# Patient Record
Sex: MALE
[De-identification: names, ages, dates, MRNs, and addresses within clinical notes are randomized per-mention and may not be internally consistent; named-entity substitution may affect disease eponyms.]

---

## 2017-06-17 ENCOUNTER — HOSPITAL ENCOUNTER (OUTPATIENT)
Dept: HOSPITAL 42 - ED | Age: 22
Setting detail: OBSERVATION
Discharge: HOME | End: 2017-06-17
Attending: EMERGENCY MEDICINE | Admitting: EMERGENCY MEDICINE
Payer: MEDICAID

## 2017-06-17 VITALS
DIASTOLIC BLOOD PRESSURE: 60 MMHG | OXYGEN SATURATION: 97 % | SYSTOLIC BLOOD PRESSURE: 131 MMHG | HEART RATE: 58 BPM | RESPIRATION RATE: 16 BRPM

## 2017-06-17 VITALS — BODY MASS INDEX: 18.2 KG/M2

## 2017-06-17 VITALS — TEMPERATURE: 98.5 F

## 2017-06-17 DIAGNOSIS — R10.9: Primary | ICD-10-CM

## 2017-06-17 LAB
ADD MANUAL DIFF?: NO
ALBUMIN/GLOB SERPL: 1.2 {RATIO} (ref 1.1–1.8)
ALP SERPL-CCNC: 73 U/L (ref 38–133)
ALT SERPL-CCNC: 35 U/L (ref 7–56)
APTT BLD: 31.3 SECONDS (ref 23.7–30.8)
AST SERPL-CCNC: 26 U/L (ref 15–59)
BASOPHILS # BLD AUTO: 0.02 K/MM3 (ref 0–2)
BASOPHILS NFR BLD: 0.3 % (ref 0–3)
BILIRUB SERPL-MCNC: 0.9 MG/DL (ref 0.2–1.3)
BUN SERPL-MCNC: 15 MG/DL (ref 7–21)
CALCIUM SERPL-MCNC: 9.8 MG/DL (ref 8.4–10.5)
CHLORIDE SERPL-SCNC: 102 MMOL/L (ref 98–107)
CO2 SERPL-SCNC: 28 MMOL/L (ref 21–33)
EOSINOPHIL # BLD: 0.1 10*3/UL (ref 0–0.7)
EOSINOPHIL NFR BLD: 1.6 % (ref 1.5–5)
ERYTHROCYTE [DISTWIDTH] IN BLOOD BY AUTOMATED COUNT: 12.5 % (ref 11.5–14.5)
GLOBULIN SER-MCNC: 3.7 GM/DL
GLUCOSE SERPL-MCNC: 88 MG/DL (ref 70–110)
GRANULOCYTES # BLD: 4.84 10*3/UL (ref 1.4–6.5)
GRANULOCYTES NFR BLD: 70.5 % (ref 50–68)
HCT VFR BLD CALC: 44.4 % (ref 42–52)
INR PPP: 1.11 (ref 0.93–1.08)
LIPASE SERPL-CCNC: 38 U/L (ref 23–300)
LYMPHOCYTES # BLD: 1.4 10*3/UL (ref 1.2–3.4)
LYMPHOCYTES NFR BLD AUTO: 20.3 % (ref 22–35)
MCH RBC QN AUTO: 31.2 PG (ref 25–35)
MCHC RBC AUTO-ENTMCNC: 34.9 G/DL (ref 31–37)
MCV RBC AUTO: 89.3 FL (ref 80–105)
MONOCYTES # BLD AUTO: 0.5 10*3/UL (ref 0.1–0.6)
MONOCYTES NFR BLD: 7.3 % (ref 1–6)
PLATELET # BLD: 163 10^3/UL (ref 120–450)
PMV BLD AUTO: 11.6 FL (ref 7–11)
POTASSIUM SERPL-SCNC: 4.4 MMOL/L (ref 3.6–5)
PROT SERPL-MCNC: 8.3 G/DL (ref 5.8–8.3)
SODIUM SERPL-SCNC: 141 MMOL/L (ref 132–148)
WBC # BLD AUTO: 6.9 10^3/UL (ref 4.5–11)

## 2017-06-17 PROCEDURE — 85025 COMPLETE CBC W/AUTO DIFF WBC: CPT

## 2017-06-17 PROCEDURE — 83690 ASSAY OF LIPASE: CPT

## 2017-06-17 PROCEDURE — 80053 COMPREHEN METABOLIC PANEL: CPT

## 2017-06-17 PROCEDURE — 36415 COLL VENOUS BLD VENIPUNCTURE: CPT

## 2017-06-17 PROCEDURE — 85610 PROTHROMBIN TIME: CPT

## 2017-06-17 PROCEDURE — 85730 THROMBOPLASTIN TIME PARTIAL: CPT

## 2017-06-17 PROCEDURE — 74177 CT ABD & PELVIS W/CONTRAST: CPT

## 2017-06-17 PROCEDURE — 99284 EMERGENCY DEPT VISIT MOD MDM: CPT

## 2017-06-17 NOTE — ED PDOC
Arrival/HPI





- General


Chief Complaint: GI Problem


Time Seen by Provider: 06/17/17 12:01


Historian: Patient





- History of Present Illness


Narrative History of Present Illness (Text): 





06/17/17 12:01





Mitesh Leyva is a 22 year old male who presents to the emergency department 

complaining of RLQ pain for 4-5 days. Patient states that his symptoms were 

relieved with a bowel movement. At present, patient feels much better but is 

still symptomatic. Patient notes that he does not usually have constipation. 

Patient denies any nausea, vomiting, or any other complaints.





PMD: None





Time/Duration: < week


Symptom Onset: Gradual


Symptom Course: Unchanged


Severity Level: Mild


Activities at Onset: Rest


Context: Home





Past Medical History





- Provider Review


Nursing Documentation Reviewed: Yes





- Past History


Past History: No Previous





- Infectious Disease


Hx of Infectious Diseases: None





- Tetanus Immunization


Tetanus Immunization: Up to Date





- Past Medical History


Past Medical History: No Previous





- Psychiatric


Hx Depression: No


Hx Emotional Abuse: No


Hx Physical Abuse: No


Hx Substance Use: No





- Past Surgical History


Past Surgical History: No Previous





- Anesthesia


Hx Anesthesia: No





- Suicidal Assessment


Feels Threatened In Home Enviroment: No





Family/Social History





- Physician Review


Nursing Documentation Reviewed: Yes


Smoking Status: Never Smoked


Hx Alcohol Use: No


Hx Substance Use: No


Hx Substance Use Treatment: No





Allergies/Home Meds


Allergies/Adverse Reactions: 


Allergies





No Known Allergies Allergy (Verified 06/17/17 12:09)


 








Home Medications: 


 Home Meds











 Medication  Instructions  Recorded  Confirmed


 


No Known Home Med [No Known Home  06/20/14 06/17/17





Med]   














Physical Exam





- Physical Exam


Narrative Physical Exam (Text): 





- Review of Systems





Constitutional: Normal.  absent: Fatigue, Weight Change, Fevers


Eyes: Normal


ENT: Normal


Respiratory: Normal  absent: SOB, Cough, Sputum


Cardiovascular: Normal absent: Chest pain, Palpitations, Syncope


Gastrointestinal: RLQ pain. absent: Diarrhea, Nausea, Vomiting


Genitourinary: Normal.  absent: Dysuria, Frequency, Hematuria


Musculoskeletal: Normal.  absent: Arthralgias, Back Pain, Neck Pain


Skin: Normal


Neurological: Normal absent: Focal Weakness


Endocrine: Normal


Hemo/Lymphatic: Normal


Psychiatric: Normal





- Physical exam





Patient appears age appropriate, speaking full sentences without difficulty





- Systems Exam





Head: Present: Atraumatic, Normocephalic


Pupils: Present: PERRL


Extraocular Muscles: Present: EOMI


Conjunctiva: Present: Normal


Mouth: Present: Moist Mucous Membranes


Neck: Present: Normal Range of Motion.  No: MIDLINE TENDERNESS, Paraspinal 

Tenderness


Respiratory/Chest: Present: Clear to Auscultation, Good Air Exchange.  No: 

Respiratory Distress, Accessory Muscle Use, Tachypneic


Cardiovascular: Present: Regular Rate and Rhythm, Normal S1, S2, Peripheral 

Pulses Present.  No: Murmurs


Abdomen: Present: Normal Bowel Sounds,  No: Tenderness, Peritoneal Signs, 

Rebound, Guarding, Distention


Back: Present: Normal Inspection.  No: Midline Tenderness, Paraspinal Tenderness


Upper Extremity: Present: Normal Inspection.  No: Cyanosis, Edema


Lower Extremity: Present: Normal Inspection.  No: Edema


Neurological: Present: GCS=15, Speech Normal, cranial nerves II through XII 

fully intact with no cerebellar abnormality, neuro-sensory fully intact. No 

focal neurological deficits.


Skin: Present: Warm, Dry, Normal Color.  No: Rashes


Lymphatic: Present: OX3, NI, NC


Psychiatric: Present: Alert, Oriented x 3, Normal Insight, Normal Concentration





Vital Signs











  Temp Pulse Resp BP Pulse Ox


 


 06/17/17 16:29   55 L  20  126/68  100


 


 06/17/17 16:02   70  18  126/68  100


 


 06/17/17 12:11  98.5 F  72  19  110/73  99














Medical Decision Making





- Lab Interpretations


Lab Results: 








 06/17/17 13:30 





 06/17/17 13:30 





 Lab Results





06/17/17 13:30: Sodium 141, Potassium 4.4, Chloride 102, Carbon Dioxide 28, 

Anion Gap 15, BUN 15, Creatinine 0.7, Est GFR (African Amer) > 60, Est GFR (Non-

Af Amer) > 60, Random Glucose 88, Calcium 9.8, Total Bilirubin 0.9, AST 26, ALT 

35, Alkaline Phosphatase 73, Total Protein 8.3, Albumin 4.6, Globulin 3.7, 

Albumin/Globulin Ratio 1.2, Lipase 38


06/17/17 13:30: PT 12.0 H, INR 1.11 H, APTT 31.3 H


06/17/17 13:30: WBC 6.9  D, RBC 4.97, Hgb 15.5, Hct 44.4, MCV 89.3, MCH 31.2, 

MCHC 34.9, RDW 12.5, Plt Count 163, MPV 11.6 H, Gran % 70.5 H, Lymph % (Auto) 

20.3 L, Mono % (Auto) 7.3 H, Eos % (Auto) 1.6, Baso % (Auto) 0.3, Gran # 4.84, 

Lymph # 1.4, Mono # 0.5, Eos # 0.1, Baso # 0.02











- Medication Orders


Current Medication Orders: 











Discontinued Medications





Iodixanol (Visipaque 320 Mg/Ml 100 Ml) Confirm Administered Dose 1,000 ml IV 

.STK-MED ONE


   Stop: 06/17/17 17:26


Iohexol (Omnipaque 240 (50 Ml)) Confirm Administered Dose 50 ml .ROUTE .STK-MED 

ONE


   Stop: 06/17/17 14:08


Iohexol (Omnipaque 350 100 Ml) Confirm Administered Dose 350 mg .ROUTE .STK-MED 

ONE


   Stop: 06/17/17 14:08


Magnesium Citrate (Citrate Of Mag)  300 ml PO ONCE ONE


   Stop: 06/17/17 13:34


   Last Admin: 06/17/17 13:42  Dose: 300 ml











ED OBSERVATION


Discharge: Yes


Date of observation admission: 06/17/17


Time of observation admission: 12:00





- Observation admission statement


Patient is being placed in observation because:: 





Impression:





22 year old male complaining of RLQ pain for 4-5 days.





Differential Diagnosis include but are not limited to:  Constipation vs. 

Appendicitis vs. Adenitis 








- Goals of Observation


Goals of observation are:: 





Plan:


-- Abdomen and Pelvis CT


-- Labs


-- Citrate of Magnesium


-- Reassess and Disposition





Prior Visits:


Notes and results from previous visits were reviewed. Patient last seen in ED 

on 03/18/15 for abnormal EKG that day. Patient was discharged home.











- Progress Note


Progress Note: 





06/17/17 16:46


On reevaluation, patient states he feels better. He states he had a bowel 

movement. 





06/17/17 18:16


On reevaluation, patient reports that she feels much better and would like to 

be discharged home. Patient's repeat abdominal exam is soft, nontender, non 

distended with positive bowel sounds in all 4 quadrants and no peritoneal 

signs. Patient is tolerating PO without any difficulty.





Pt states he understands to return to the ER right away for new or worsening 

symptoms or for inability to f/u with PMD or specialist as instructed. 





Patient states that he fully agrees with and understands discharge 

instructions. States that he agrees with the plan and disposition. Verbalized 

and repeated discharge instructions and plan. I have given the patient 

opportunity to ask any additional questions. 





CT Abd  Pelvis FINDINGS:


Lower thorax: No acute findings.


ABDOMEN:


Liver: Unremarkable. No mass.


Gallbladder and bile ducts: Unremarkable. No calcified stones. No ductal 

dilation.


Pancreas: Unremarkable. No mass. No ductal dilation.


Spleen: Unremarkable. No splenomegaly.


Adrenals: Unremarkable. No mass.


Kidneys and ureters: Unremarkable. No solid mass. No hydronephrosis.


Stomach and bowel: Unremarkable. No obstruction. No mucosal thickening.


Appendix: The appendix is not definitely identified. No secondary signs of 

acute appendicitis noted.


PELVIS:


MITESH LEYVA Accession: [id number] MRN: [id number] | Preliminary Radiology 

Report


Bladder: Bladder not well distended.


Reproductive: Unremarkable as visualized.


ABDOMEN and PELVIS:


Intraperitoneal space: Unremarkable. No free air. No significant fluid 

collection.


Bones/joints: No acute fracture. No dislocation.


Soft tissues: Unremarkable.


Vasculature: Unremarkable. No abdominal aortic aneurysm.


Lymph nodes: Unremarkable. No enlarged lymph nodes.


IMPRESSION:


No evidence for acute abnormality.


Preliminary interpretation is based on receipt of 1342 image(s). A final report 

will be issued


subsequently. We appreciate the opportunity to be involved in this patient's 

care.


Thank you for allowing us to participate in the care of your patient.


Dictated and Authenticated by: Sharmila Thomas MD








- [facility]sol Statement


The provider has reviewed the documentation as recorded by the Marysol Carney





Provider Scribe Attestation:


All medical record entries made by the Scribe were at my direction and 

personally dictated by me. I have reviewed the chart and agree that the record 

accurately reflects my personal performance of the history, physical exam, 

medical decision making, and the department course for this patient. I have 

also personally directed, reviewed, and agree with the discharge instructions 

and disposition.





Disposition/Present on Arrival





- Present on Arrival


Any Indicators Present on Arrival: No


History of DVT/PE: No


History of Uncontrolled Diabetes: No


Urinary Catheter: No


History of Decub. Ulcer: No


History Surgical Site Infection Following: None





- Disposition


Have Diagnosis and Disposition been Completed?: Yes


Diagnosis: 


 Abdominal pain





Disposition: HOME/ ROUTINE


Disposition Time: 12:00


Patient Plan: Discharge


Condition: GOOD

## 2017-06-17 NOTE — CT
PROCEDURE:  CT Abdomen and Pelvis with oral and  IV contrast.



HISTORY:

abd pain



COMPARISON:

None available.



TECHNIQUE:

Contiguous axial images of the abdomen and pelvis. Oral and IV 

contrast was administered. Coronal and Sagittal reformats generated 

and reviewed. 



Contrast dose: 100 mL Omnipaque 300



Radiation dose:



Total exam DLP = 208.12 mGy-cm.



This CT exam was performed using one or more of the following dose 

reduction techniques: Automated exposure control, adjustment of the 

mA and/or kV according to patient size, and/or use of iterative 

reconstruction technique.



FINDINGS:





LOWER THORAX:

No visible consolidation, pleural effusion, or pneumothorax.



LIVER:

Unremarkable. 



GALLBLADDER AND BILE DUCTS:

Unremarkable. 



PANCREAS:

Unremarkable.



SPLEEN:

Unremarkable. 



ADRENALS:

Unremarkable. 



KIDNEYS AND URETERS:

The kidneys enhance symmetrically. No hydronephrosis or obstructing 

renal calculus. 



BLADDER:

Underdistention of the urinary bladder appears otherwise unremarkable.



REPRODUCTIVE:

Unremarkable. 



APPENDIX:

Not visualized.  No secondary signs of acute appendicitis identified.



BOWEL:

The stomach is nondistended. 



The bowel loops appear within normal limits of caliber without 

evidence of intestinal obstruction.



PERITONEUM:

No significant free fluid. No definite free air.



LYMPH NODES:

No bulky lymphadenopathy identified.



VASCULATURE:

No aortic aneurysm. 



BONES:

No acute osseous abnormality is detected.



OTHER FINDINGS:

None. 



IMPRESSION:

No acute findings.  See above. 



Preliminary impression was provided by virtual radiologic.

## 2018-05-05 ENCOUNTER — HOSPITAL ENCOUNTER (EMERGENCY)
Dept: HOSPITAL 42 - ED | Age: 23
Discharge: HOME | End: 2018-05-05
Payer: MEDICAID

## 2018-05-05 VITALS — TEMPERATURE: 98.7 F | RESPIRATION RATE: 16 BRPM

## 2018-05-05 VITALS — SYSTOLIC BLOOD PRESSURE: 112 MMHG | DIASTOLIC BLOOD PRESSURE: 76 MMHG | OXYGEN SATURATION: 99 % | HEART RATE: 62 BPM

## 2018-05-05 VITALS — BODY MASS INDEX: 18.2 KG/M2

## 2018-05-05 DIAGNOSIS — L02.11: Primary | ICD-10-CM

## 2018-05-05 NOTE — ED PDOC
Arrival/HPI





- General


Chief Complaint: Abnormal Skin Integrity


Time Seen by Provider: 05/05/18 13:00


Historian: Patient





- History of Present Illness


Narrative History of Present Illness (Text): 





05/05/18 13:00


This 24 yo male presents to [facility] ED c/o left sided neck boil x 1 month.  

Patient denies other complains.


Time/Duration: > month


Context: Home





Past Medical History





- Provider Review


Nursing Documentation Reviewed: Yes





- Past History


Past History: No Previous





- Infectious Disease


Hx of Infectious Diseases: None





- Tetanus Immunization


Tetanus Immunization: Up to Date





- Past Medical History


Past Medical History: No Previous





- Psychiatric


Hx Depression: No


Hx Emotional Abuse: No


Hx Physical Abuse: No


Hx Substance Use: No





- Past Surgical History


Past Surgical History: No Previous





- Anesthesia


Hx Anesthesia: No





- Suicidal Assessment


Feels Threatened In Home Enviroment: No





Family/Social History





- Physician Review


Nursing Documentation Reviewed: Yes


Family/Social History: Other (noncontributory)


Smoking Status: Never Smoked


Hx Alcohol Use: No


Hx Substance Use: No


Hx Substance Use Treatment: No





Allergies/Home Meds


Allergies/Adverse Reactions: 


Allergies





No Known Allergies Allergy (Verified 06/17/17 12:09)


 











Review of Systems





- Review of Systems


Constitutional: Normal.  absent: Fatigue, Weight Change, Fevers


Eyes: Normal


ENT: Normal


Respiratory: Normal


Cardiovascular: Normal


Gastrointestinal: Normal


Genitourinary Male: Normal


Musculoskeletal: Normal


Skin: Abscess


Neurological: Normal


Endocrine: Normal


Hemo/Lymphatic: Normal


Psychiatric: Normal





Physical Exam


Vital Signs











  Temp Pulse Resp BP Pulse Ox


 


 05/05/18 14:17  98.7 F  62  16  112/76  99


 


 05/05/18 12:13  98.7 F  54 L  16  114/75  96











Temperature: Afebrile


Blood Pressure: Normal


Pulse: Regular


Respiratory Rate: Normal


Appearance: Positive for: Well-Appearing, Non-Toxic, Comfortable


Pain Distress: None


Mental Status: Positive for: Alert and Oriented X 3





- Systems Exam


Head: Present: Atraumatic, Normocephalic


Pupils: Present: PERRL


Extroacular Muscles: Present: EOMI


Conjunctiva: Present: Normal


Mouth: Present: Moist Mucous Membranes


Neck: Present: Normal Range of Motion, Trachea Midline, Other ((+) left sided 

indurated abscess.  It measure approx. 1 cm, mobile).  No: Meningeal Signs, 

MIDLINE TENDERNESS, Lymphadenopathy


Respiratory/Chest: Present: Clear to Auscultation, Good Air Exchange.  No: 

Respiratory Distress, Accessory Muscle Use


Cardiovascular: Present: Regular Rate and Rhythm, Normal S1, S2.  No: Murmurs


Abdomen: No: Tenderness, Distention, Peritoneal Signs


Upper Extremity: Present: Normal Inspection, Normal ROM.  No: Cyanosis, Edema


Lower Extremity: Present: Normal Inspection, Normal ROM.  No: Edema


Neurological: Present: GCS=15, CN II-XII Intact, Speech Normal


Skin: Present: Warm, Dry, Normal Color.  No: Rashes


Psychiatric: Present: Alert, Oriented x 3, Normal Insight, Normal Concentration





Medical Decision Making


ED Course and Treatment: 





05/05/18 13:35


Re-evaluation.  Patient feels better.  Discussed results and plan with patient 

who expresses understanding.  All questions answered and there is agreement 

with the plan to discharge home with instructions.  Patient stable for 

discharge.  Return if symptoms persist or worsen.


Patient was recommended to apply warmth compress, and to take ABX.  He was 

recommended to have clinic doctor to the abscess check in 2-3 days.  Patient 

was recommended if abscess persist for more that 5 days, to have it check by 

the doctor again.


Re-evaluation Time: 14:52


Reassessment Condition: Re-examined, Unchanged





- Medication Orders


Current Medication Orders: 











Discontinued Medications





Trimethoprim/Sulfamethoxazole (Bactrim Ds Tab)  1 tab PO STAT STA


   PRN Reason: Protocol


   Stop: 05/05/18 13:35


   Last Admin: 05/05/18 13:58  Dose: 1 tab











Disposition/Present on Arrival





- Present on Arrival


Any Indicators Present on Arrival: No


History of DVT/PE: No


History of Uncontrolled Diabetes: No


Urinary Catheter: No


History of Decub. Ulcer: No


History Surgical Site Infection Following: None





- Disposition


Have Diagnosis and Disposition been Completed?: Yes


Diagnosis: 


 Abscess





Disposition: HOME/ ROUTINE


Disposition Time: 13:36


Condition: GOOD


Discharge Instructions (ExitCare):  Boil


Additional Instructions: 


Call private doctor or clinic for follow up visit in 1-2 days.  Apply warmth 

compress 3-4 times a day for 3-5 days.  Take medication as instructed.  Return 

to emergency if boil worsen.  Also, see you doctor if boil persist for more 

than 5 days


Prescriptions: 


Sulfamethoxazole/Trimethoprim [Bactrim  mg-160 mg] 1 tab PO BID #14 tab


Referrals: 


 Service [Outside] - Follow up with primary


[facility] [Outside] - Follow up with primary


Forms:  [employer] (English)